# Patient Record
Sex: FEMALE | Race: WHITE
[De-identification: names, ages, dates, MRNs, and addresses within clinical notes are randomized per-mention and may not be internally consistent; named-entity substitution may affect disease eponyms.]

---

## 2017-02-13 ENCOUNTER — HOSPITAL ENCOUNTER (OUTPATIENT)
Dept: HOSPITAL 10 - GIL | Age: 27
Discharge: HOME | End: 2017-02-13
Attending: INTERNAL MEDICINE
Payer: COMMERCIAL

## 2017-02-13 VITALS — SYSTOLIC BLOOD PRESSURE: 105 MMHG | DIASTOLIC BLOOD PRESSURE: 69 MMHG | RESPIRATION RATE: 14 BRPM | HEART RATE: 48 BPM

## 2017-02-13 VITALS
WEIGHT: 150.13 LBS | BODY MASS INDEX: 24.13 KG/M2 | WEIGHT: 150.13 LBS | HEIGHT: 66 IN | HEIGHT: 66 IN | BODY MASS INDEX: 24.13 KG/M2

## 2017-02-13 VITALS — DIASTOLIC BLOOD PRESSURE: 51 MMHG | HEART RATE: 49 BPM | RESPIRATION RATE: 12 BRPM | SYSTOLIC BLOOD PRESSURE: 103 MMHG

## 2017-02-13 DIAGNOSIS — R19.4: Primary | ICD-10-CM

## 2017-02-13 DIAGNOSIS — K29.70: ICD-10-CM

## 2017-02-13 DIAGNOSIS — K64.8: ICD-10-CM

## 2017-02-13 DIAGNOSIS — K20.8: ICD-10-CM

## 2017-02-13 PROCEDURE — 88312 SPECIAL STAINS GROUP 1: CPT

## 2017-02-13 PROCEDURE — 88305 TISSUE EXAM BY PATHOLOGIST: CPT

## 2017-02-13 PROCEDURE — 45380 COLONOSCOPY AND BIOPSY: CPT

## 2017-02-13 PROCEDURE — 43239 EGD BIOPSY SINGLE/MULTIPLE: CPT

## 2017-02-13 PROCEDURE — 84703 CHORIONIC GONADOTROPIN ASSAY: CPT

## 2017-02-14 NOTE — GILP
DATE OF PROCEDURE:  02/13/2017

 

 

PROCEDURE:  Colonoscopy with biopsies.  

 

HISTORY AND INDICATIONS:  The patient is being evaluated for changes in bowel habits and abdominal p
ain.

 

PREMEDICATION:  Monitored anesthesia care by anesthesiologist.

 

SURGEON:  Terence Davis MD

 

INSTRUMENT USED:  Olympus colonoscope.

 

TECHNIQUE: After informed consent, with the patient/relatives understanding the procedure, its indic
ations potential risks and complications, including but not limited to: allergic reaction, bleeding,
 perforation, infection, missed lesions and after all pertinent questions were answered to the patie
nt's satisfaction, the patient/relatives signed the witnessed informed consent.

Following this, premedication was administered slowly IV push by under careful cardiovascular and re
spiratory monitoring with pulse oximetry, automatic blood pressure and EKG monitor. Once the sedativ
e effect was achieved, the patient was placed in the left lateral decubitus position, digital rectal
 examination was performed. The colonoscope was then introduced and advanced under visual control th
roughout all segments of the colon including: the rectum, sigmoid, descending colon, splenic flexure
, transverse colon, hepatic flexure, ascending colon and finally reaching the cecum which was clearl
y identified by transillumination, finger indentation and the ileocecal valve. Careful examination o
f the mucosa of the lower gastrointestinal tract both on insertion as well as withdrawal of the inst
rument disclosed the following findings:

 

Rectal Examination: No evidence of perirectal disease, no masses.

 

Colonic Mucosa:  Unremarkable throughout. The ileocecal valve was clearly identified and appears unr
emarkable.  The instrument was withdrawn, reexamining the mucosa in detail.  No additional abnormali
ties are noted.  Random biopsies were obtained in the right and left side of the colon to rule out m
icroscopic, lymphocytic, or collagenous colitis.  Moderate sized internal hemorrhoids are noted on w
ithdrawal of the instrument through the anal canal.

The instrument was then withdrawn, the patient tolerated the procedure well and was transferred out 
of the Endoscopy Suite awake and in good condition to continue recovery under observation.

 

IMPRESSION:

1.  Normal colonic mucosa, rule out microscopic, lymphocytic, or collagenous colitis.  Biopsies obta
ined, right and left colon, randomly.

2.  Moderate-sized internal hemorrhoids.

 

PLAN:  The patient will continue present regimen.  Pathology will be reviewed as soon as available. 
 Further recommendation will depend on the patient's clinical course as well as review  of  biopsies
.

 

 

Dictated By: TERENCE DAVIS

 

MS/NTS

DD:    02/14/2017 12:32:16

DT:    02/14/2017 14:42:42

Conf#: [id number]

DID#:  [id number]

## 2017-02-14 NOTE — GILP
DATE OF PROCEDURE:  02/13/2017

 

 

NAME OF PROCEDURE:  Esophagogastroduodenoscopy with biopsies.  

 

SURGEON: Terence Davis MD 

 

PREOPERATIVE DIAGNOSIS:  

 

POSTOPERATIVE DIAGNOSIS:  

 

BRIEF HISTORY AND INDICATIONS:  The patient is being evaluated for abdominal pain and GERD symptoms.
  

 

PREMEDICATION:  Monitored anesthesia care. 

 

INSTRUMENT USED:  Olympus panendoscope.  

 

TECHNIQUE: After informed consent, with the patient/relatives understanding the procedure, its indic
ations, potential risks and complications, including but not limited to: allergic reaction, bleeding
, perforation or infection, and after all pertinent questions were answered to the patient's satisfa
ction, the patient/relatives signed witnessed informed consent. 

 

Following this, premedication was administered slowly IV push under careful cardiovascular and respi
ratory monitoring with pulse oximetry, automatic blood pressure and EKG monitor.

Once the sedative effect was achieved the patient was place in the left lateral decubitus, the panen
doscope was introduced and advanced under visual control.

 

Careful examination of the upper gastrointestinal tract, both on insertion as well as withdrawal of 
the instrument disclosed the following findings:

 

ESOPHAGUS:  The distal esophagus shows erythema and edema of the mucosa with superficial erosion.

 

STOMACH:  Upon entrance to the stomach, air was insufflated, the gastric walls distended normally.  
There is erythema and edema of the mucosa of a moderate degree.  Biopsies were obtained to rule out 
H pylori infection.

 

PYLORUS:  The pylorus appears patent and within normal limits, with no evidence of gastric outlet ob
struction.

 

DUODENUM:  The duodenal mucosa was carefully examined in the duodenal bulb as well as the second por
tion of the duodenum and appears unremarkable with no evidence of duodenitis, ulcer or neoplasm.

 

The instrument was then withdrawn, the patient tolerated the procedure well and was transfer out of 
the endoscopy suite awake, and in good condition to continue recovery under observation

 

IMPRESSION:

1.  Erosive esophagitis.

2.  Gastritis, rule out Helicobacter pylori infection, biopsies obtained.

 

PLAN:  The patient will be treated with PPIs.  Further recommendation will depend on the patient's c
linical course as well as review of biopsies.

 

 

Dictated By: TERENCE DAVIS MS/NEEL

DD:    02/14/2017 12:30:39

DT:    02/14/2017 14:39:21

Conf#: [id number]

DID#:  [id number]

## 2023-11-09 ENCOUNTER — APPOINTMENT (RX ONLY)
Dept: URBAN - METROPOLITAN AREA CLINIC 46 | Facility: CLINIC | Age: 33
Setting detail: DERMATOLOGY
End: 2023-11-09

## 2023-11-09 DIAGNOSIS — L81.4 OTHER MELANIN HYPERPIGMENTATION: ICD-10-CM

## 2023-11-09 DIAGNOSIS — D22 MELANOCYTIC NEVI: ICD-10-CM

## 2023-11-09 PROBLEM — D22.39 MELANOCYTIC NEVI OF OTHER PARTS OF FACE: Status: ACTIVE | Noted: 2023-11-09

## 2023-11-09 PROBLEM — D22.9 MELANOCYTIC NEVI, UNSPECIFIED: Status: ACTIVE | Noted: 2023-11-09

## 2023-11-09 PROCEDURE — ? COUNSELING

## 2023-11-09 PROCEDURE — ? DEFER

## 2023-11-09 PROCEDURE — ? OBSERVATION

## 2023-11-09 PROCEDURE — ? SUNSCREEN TREATMENT REGIMEN

## 2023-11-09 PROCEDURE — 99203 OFFICE O/P NEW LOW 30 MIN: CPT

## 2023-11-09 ASSESSMENT — LOCATION DETAILED DESCRIPTION DERM: LOCATION DETAILED: LEFT CENTRAL MALAR CHEEK

## 2023-11-09 ASSESSMENT — LOCATION SIMPLE DESCRIPTION DERM: LOCATION SIMPLE: LEFT CHEEK

## 2023-11-09 ASSESSMENT — LOCATION ZONE DERM: LOCATION ZONE: FACE

## 2023-11-09 NOTE — PROCEDURE: OBSERVATION
Body Location Override (Optional - Billing Will Still Be Based On Selected Body Map Location If Applicable): Left malar cheek
Detail Level: Zone
Size Of Lesion In Cm (Optional): 0

## 2023-11-09 NOTE — PROCEDURE: DEFER
Introduction Text (Please End With A Colon): Left lateral foot 0.5cm DN/ NEEDS PRIOR AUTH ;
Instructions (Optional): Left lateral foot 0.5cm DN
Size Of Lesion In Cm (Optional): 0.5
X Size Of Lesion In Cm (Optional): 0
Detail Level: Zone